# Patient Record
Sex: MALE | Race: AMERICAN INDIAN OR ALASKA NATIVE
[De-identification: names, ages, dates, MRNs, and addresses within clinical notes are randomized per-mention and may not be internally consistent; named-entity substitution may affect disease eponyms.]

---

## 2021-12-27 ENCOUNTER — HOSPITAL ENCOUNTER (OUTPATIENT)
Dept: HOSPITAL 5 - OR | Age: 61
Discharge: HOME | End: 2021-12-27
Attending: UROLOGY
Payer: COMMERCIAL

## 2021-12-27 VITALS — DIASTOLIC BLOOD PRESSURE: 88 MMHG | SYSTOLIC BLOOD PRESSURE: 133 MMHG

## 2021-12-27 DIAGNOSIS — Z98.890: ICD-10-CM

## 2021-12-27 DIAGNOSIS — Z20.822: ICD-10-CM

## 2021-12-27 DIAGNOSIS — N40.1: ICD-10-CM

## 2021-12-27 DIAGNOSIS — R97.20: Primary | ICD-10-CM

## 2021-12-27 DIAGNOSIS — K21.9: ICD-10-CM

## 2021-12-27 DIAGNOSIS — F17.210: ICD-10-CM

## 2021-12-27 DIAGNOSIS — Z79.899: ICD-10-CM

## 2021-12-27 DIAGNOSIS — G47.33: ICD-10-CM

## 2021-12-27 DIAGNOSIS — E66.9: ICD-10-CM

## 2021-12-27 PROCEDURE — U0003 INFECTIOUS AGENT DETECTION BY NUCLEIC ACID (DNA OR RNA); SEVERE ACUTE RESPIRATORY SYNDROME CORONAVIRUS 2 (SARS-COV-2) (CORONAVIRUS DISEASE [COVID-19]), AMPLIFIED PROBE TECHNIQUE, MAKING USE OF HIGH THROUGHPUT TECHNOLOGIES AS DESCRIBED BY CMS-2020-01-R: HCPCS

## 2021-12-27 PROCEDURE — 55700: CPT

## 2021-12-27 PROCEDURE — 76872 US TRANSRECTAL: CPT

## 2021-12-27 PROCEDURE — 88305 TISSUE EXAM BY PATHOLOGIST: CPT

## 2021-12-27 PROCEDURE — 74420 UROGRAPHY RTRGR +-KUB: CPT

## 2021-12-27 PROCEDURE — 52005 CYSTO W/URTRL CATHJ: CPT

## 2021-12-27 PROCEDURE — C1758 CATHETER, URETERAL: HCPCS

## 2021-12-27 NOTE — SHORT STAY SUMMARY
Short Stay Documentation


Date of service: 12/27/21





- History


H&P: obtained from office





- Allergies and Medications


Current Medications: 


                                    Allergies





No Known Allergies Allergy (Verified 11/19/21 16:24)


   





                                Home Medications











 Medication  Instructions  Recorded  Confirmed  Last Taken  Type


 


Oxybutynin [Ditropan] 5 mg PO DAILY 11/29/21 12/21/21 Unknown History








Active Medications





Hydrocodone Bitart/Acetaminophen (Hydrocodone/Acetaminophen 5-325 Mg Tab)  2 

each PO ONCE PRN


   PRN Reason: Pain, Moderate (4-6)


   Stop: 12/27/21 18:00


Fentanyl (Fentanyl 100 Mcg/2 Ml Inj)  50 mcg IV Q5MIN PRN


   PRN Reason: Pain , Severe (7-10)


   Stop: 12/27/21 18:00


Lactated Ringer's (Lactated Ringers)  1,000 mls @ 100 mls/hr IV AS DIRECT [facility]


   Stop: 12/27/21 23:59


Cefazolin Sodium 3 gm/ Sodium (Chloride)  100 mls @ 100 mls/30 min IV PREOP [facility];

 Protocol


   Stop: 12/27/21 19:00


Gentamicin Sulfate/Sodium Chloride (Gentamicin/Ns 80 Mg/100 Ml)  100 mls @ 200 

mls/hr IV ONCE@0800 [facility]


   Stop: 12/27/21 17:00


Ondansetron HCl (Ondansetron 4 Mg/2 Ml Inj)  4 mg IV ONCE PRN


   PRN Reason: Nausea And Vomiting


   Stop: 12/27/21 18:00











- Brief post op/procedure progress note


Date of procedure: 12/27/21


Pre-op diagnosis: elevated psa, bph


Post-op diagnosis: other (31cc prostate)


Procedure: 





cysto, rpg, pus bx


Anesthesia: GETA


Surgeon: ADRIEN NICOLE


Estimated blood loss: minimal


Pathology: list (prostate cores)


Specimen disposition: to lab


Condition: stable





- Hospital course


Hospital course: 





bactrim,norco, post op info on chart





- Disposition


Condition at discharge: Stable


Disposition: 01 HOME / SELF CARE / HOMELESS





Short Stay Discharge Plan


Follow up with: 


NAVEEN VASQUEZ MD [Primary Care Provider] - 7 Days

## 2021-12-27 NOTE — ANESTHESIA CONSULTATION
Anesthesia Consult and Med Hx


Date of service: 12/27/21





- Airway


Anesthetic Teeth Evaluation: Good


ROM Head & Neck: Adequate


Mental/Hyoid Distance: Adequate


Mallampati Class: Class III


Intubation Access Assessment: Possibly Difficult





- Pre-Operative Health Status


ASA Pre-Surgery Classification: ASA3


Proposed Anesthetic Plan: General





- Pulmonary


Hx Smoking: Yes (occasional cigar)


Hx Respiratory Symptoms: No


Hx Sleep Apnea: Yes (+ CPAP )





- Cardiovascular System


Hx Hypertension: No


Hx Heart Attack/AMI: No (normal LV function per cardiology note)


Hx Percutaneous Transluminal Coronary Angioplasty (PTCA): No


Hx Cardia Arrhythmia: No





- Central Nervous System


CVA: No





- Gastrointestinal


Hx Gastroesophageal Reflux Disease: Yes





- Endocrine


Hx Renal Disease: No


Hx Liver Disease: No


Hx Insulin Dependent Diabetes: No


Hx Non-Insulin Dependent Diabetes: No


Hx Thyroid Disease: No





- Other Systems


Hx Obesity: Yes (BMI 58)





- Additional Comments


Anesthesia Medical History Comments: No hx anesthetic complications. Preop 

cardiology eval on chart.

## 2021-12-27 NOTE — OPERATIVE REPORT
DATE OF SURGERY: 12/27/2021



PREOPERATIVE DIAGNOSIS:  Elevated prostate specific antigen, benign prostatic 

hypertrophy.



POSTOPERATIVE DIAGNOSIS:  Elevated prostate specific antigen, benign prostatic 

hypertrophy.



PROCEDURES:  Cystoscopy, bilateral retrograde pyelograms, transrectal ultrasound

biopsy of prostate 32 grams.



SURGEON:  Blu Posada MD



ANESTHESIA:  General.



ESTIMATED BLOOD LOSS:  Minimal.



FLUIDS:  Crystalloid.



COMPLICATIONS:  No complications.



INDICATIONS:  This patient is a 61-year-old gentleman known to our service with 

an elevated PSA in the past.  He had a negative biopsy in 2015.  PSA of 6.  PSA 

continued to rise.  He underwent an MRI of the prostate and was found to have a 

lesion on the right side.  Discussed fusion versus ultrasound biopsy, possible 

saturation.  He agreed to proceed with ultrasound biopsy with me.



DESCRIPTION OF PROCEDURE:  The patient was taken to the operative suite, placed 

in a supine position.  After adequate general anesthesia, placed in the dorsal 

lithotomy position, prepped and draped in a sterile fashion.  

Pan-cystourethroscopy was performed with a 22-Malian Storz cystoscope, no 

urethral abnormalities.  His prostate displayed mild trilobar obstruction.  

Bladder, no tumors or stones.  Mild diffuse trabeculation.  Both ureteral 

orifices in normal position.  No tumors or stones.  Bilateral retrograde 

pyelograms were obtained with an 8-Malian Kaylah catheter and 8 mL of 

contrast.  No filling defects or obstruction.  Next, using a transrectal probe 

real time ultrasound was performed.  Sagittal and transverse plane was measured,

32 mL gram prostate.  He did have some calcifications on the right side.  No 

obvious lesion.  Twelve-core biopsy was obtained through the calcifications on 

the right side with a focus on those calcifications.  The patient tolerated the 

procedure well.  Rectal exam was benign.  He was extubated and taken to recovery

room.  He will go home on Bactrim and Norco and follow up in the office.







DD: 12/27/2021 10:27 AM

DT: 12/27/2021 10:42 AM

TID: [id number] RECEIPT: [id number]

AMY/FRANKY

## 2021-12-27 NOTE — ULTRASOUND REPORT
Limited prostate Ultrasound



HISTORY: ELEVATED PSA.



TECHNIQUE:  Grayscale imaging performed.





IMPRESSION: Ultrasound guidance provided for transrectal prostate biopsy. The prostate volume is 32 m
L with heterogeneous appearance. Please refer to the operative note for complete details.



Signer Name: Grady Mendez MD 

Signed: 12/27/2021 10:36 AM

Workstation Name: [id number]

## 2021-12-27 NOTE — FLUOROSCOPY REPORT
RETROGRADE PYELOGRAM 8 VIEWS [id number]



INDICATION: ELEVATED PSA W/BPH



COMPARISON: None available.



FINDINGS: 8 C-arm views are presented. These show injections of both ureters without evidence of obst
ruction or calyceal dilatation. No obvious calculi are identified. Good drainage is seen.







Signer Name: Marco A Sagastume MD 

Signed: 12/27/2021 3:21 PM

Workstation Name: [id number]